# Patient Record
Sex: MALE | Race: WHITE | URBAN - METROPOLITAN AREA
[De-identification: names, ages, dates, MRNs, and addresses within clinical notes are randomized per-mention and may not be internally consistent; named-entity substitution may affect disease eponyms.]

---

## 2024-08-26 ENCOUNTER — APPOINTMENT (OUTPATIENT)
Dept: SURGICAL ONCOLOGY | Facility: CLINIC | Age: 74
End: 2024-08-26
Payer: MEDICARE

## 2024-08-26 ENCOUNTER — LAB (OUTPATIENT)
Dept: LAB | Facility: LAB | Age: 74
End: 2024-08-26
Payer: MEDICARE

## 2024-08-26 VITALS
RESPIRATION RATE: 20 BRPM | TEMPERATURE: 98 F | SYSTOLIC BLOOD PRESSURE: 164 MMHG | WEIGHT: 164.6 LBS | BODY MASS INDEX: 22.29 KG/M2 | HEIGHT: 72 IN | DIASTOLIC BLOOD PRESSURE: 80 MMHG | HEART RATE: 92 BPM

## 2024-08-26 DIAGNOSIS — K86.89 DILATED PANCREATIC DUCT (HHS-HCC): ICD-10-CM

## 2024-08-26 DIAGNOSIS — D37.8 NEOPLASM OF UNCERTAIN BEHAVIOR OF PANCREAS: ICD-10-CM

## 2024-08-26 PROCEDURE — 1159F MED LIST DOCD IN RCRD: CPT | Performed by: SURGERY

## 2024-08-26 PROCEDURE — 1036F TOBACCO NON-USER: CPT | Performed by: SURGERY

## 2024-08-26 PROCEDURE — 36415 COLL VENOUS BLD VENIPUNCTURE: CPT

## 2024-08-26 PROCEDURE — 3008F BODY MASS INDEX DOCD: CPT | Performed by: SURGERY

## 2024-08-26 PROCEDURE — 1125F AMNT PAIN NOTED PAIN PRSNT: CPT | Performed by: SURGERY

## 2024-08-26 PROCEDURE — 99203 OFFICE O/P NEW LOW 30 MIN: CPT | Performed by: SURGERY

## 2024-08-26 PROCEDURE — 86301 IMMUNOASSAY TUMOR CA 19-9: CPT

## 2024-08-26 RX ORDER — METOPROLOL SUCCINATE 50 MG/1
50 TABLET, EXTENDED RELEASE ORAL
COMMUNITY
Start: 2024-06-20 | End: 2025-06-20

## 2024-08-26 RX ORDER — FAMOTIDINE 20 MG/1
20 TABLET, FILM COATED ORAL 2 TIMES DAILY
COMMUNITY

## 2024-08-26 RX ORDER — LOPERAMIDE HCL 2 MG
2 TABLET ORAL 3 TIMES DAILY PRN
COMMUNITY

## 2024-08-26 RX ORDER — VERAPAMIL HYDROCHLORIDE 240 MG/1
240 TABLET, FILM COATED, EXTENDED RELEASE ORAL NIGHTLY
COMMUNITY
Start: 2024-05-22

## 2024-08-26 RX ORDER — TRAZODONE HYDROCHLORIDE 50 MG/1
100 TABLET ORAL NIGHTLY
COMMUNITY
Start: 2024-02-22

## 2024-08-26 RX ORDER — NAPROXEN 500 MG/1
1 TABLET ORAL
COMMUNITY
Start: 2024-06-03

## 2024-08-26 RX ORDER — TESTOSTERONE 50 MG/5G
50 GEL TRANSDERMAL DAILY
COMMUNITY
Start: 2024-05-22 | End: 2024-11-25

## 2024-08-26 RX ORDER — LOSARTAN POTASSIUM 100 MG/1
100 TABLET ORAL
COMMUNITY
Start: 2024-05-22

## 2024-08-26 RX ORDER — ASPIRIN 325 MG
325 TABLET ORAL DAILY
COMMUNITY

## 2024-08-26 RX ORDER — FLUTICASONE PROPIONATE 50 MCG
1 SPRAY, SUSPENSION (ML) NASAL
COMMUNITY
Start: 2024-02-22 | End: 2025-02-21

## 2024-08-26 ASSESSMENT — PAIN SCALES - GENERAL: PAINLEVEL: 10-WORST PAIN EVER

## 2024-08-26 NOTE — PROGRESS NOTES
Subjective     Adán Rand is a 74 y.o. male who is referred by self, patient of Brandon Figueroa. Question of periampullary mass.    HPI    Patient had left-sided abdominal pain which led to imaging.  It shows a double duct sign as evidenced on CT scan.  There is dilated pancreatic and bile duct.  He is here for evaluation.  He has had mild weight loss, 8 to 10 pounds.  Otherwise his health is stable.    Review of Systems   All other systems reviewed and are negative.           Social History     Socioeconomic History    Marital status:      Spouse name: Not on file    Number of children: Not on file    Years of education: Not on file    Highest education level: Not on file   Occupational History    Not on file   Tobacco Use    Smoking status: Former     Current packs/day: 0.00     Types: Cigarettes     Quit date:      Years since quittin.6    Smokeless tobacco: Never   Substance and Sexual Activity    Alcohol use: Yes     Alcohol/week: 3.0 standard drinks of alcohol     Types: 3 Glasses of wine per week    Drug use: Not Currently    Sexual activity: Defer   Other Topics Concern    Not on file   Social History Narrative    Not on file     Social Determinants of Health     Financial Resource Strain: Not on file   Food Insecurity: Not on file   Transportation Needs: Not on file   Physical Activity: Not on file   Stress: Not on file   Social Connections: Not on file   Intimate Partner Violence: Not on file   Housing Stability: Not on file      Current Outpatient Medications on File Prior to Visit   Medication Sig Dispense Refill    fluticasone (Flonase) 50 mcg/actuation nasal spray 1 spray by Does not apply route once daily.      losartan (Cozaar) 100 mg tablet Take 1 tablet (100 mg) by mouth once daily.      metoprolol succinate XL (Toprol-XL) 50 mg 24 hr tablet Take 1 tablet (50 mg) by mouth once daily.      naproxen (EC Naprosyn) 500 mg EC tablet Take 1 tablet (500 mg) by mouth 2 times daily (morning  "and late afternoon).      testosterone 50 mg/5 gram (1 %) gel Place 1 packet (50 mg) on the skin once daily.      traZODone (Desyrel) 50 mg tablet Take 2 tablets (100 mg) by mouth once daily at bedtime.      verapamil SR (Calan-SR) 240 mg ER tablet Take 1 tablet (240 mg) by mouth once daily at bedtime.      aspirin 325 mg tablet Take 1 tablet (325 mg) by mouth once daily.      famotidine (Pepcid) 20 mg tablet Take 1 tablet (20 mg) by mouth twice a day.      loperamide (Imodium A-D) 2 mg tablet Take 1 tablet (2 mg) by mouth 3 times a day as needed for diarrhea.       No current facility-administered medications on file prior to visit.      No family history on file.   Past Medical History:   Diagnosis Date    Psoriasis       History reviewed. No pertinent surgical history.     Objective     /80 (BP Location: Left arm)   Pulse 92   Temp 36.7 °C (98 °F) (Oral)   Resp 20   Ht 1.82 m (5' 11.65\")   Wt 74.7 kg (164 lb 9.6 oz)   BMI 22.54 kg/m²      Physical Exam  General: in no acute distress, comfortable  Eyes: no pallor or scleral icterus  Ears, nose, throat: no oropharyngeal edema  Cardiovascular: normal rate, regular rhythm  Respiratory: clear breath sounds, symmetric, no wheezes  Gastrointestinal: abdomen soft, non-tender, no masses  Musculoskeletal: normal gate, no deformities  Integumentary: no concerning lesions, no jaundice  Lymphatic: no abnormally palpable lymph nodes  Neurologic: no gross deficits  Psychiatric: cognition intact, mood appropriate    RESULTS  CT shows a double duct sign.  There is no evidence of any mass in the head of the pancreas.  I do believe I can see the bile duct and pancreas duct all the way to the ampulla.  An EUS was performed and no mass was seen.    Assessment/Plan      In summary the patient has a double duct sign.  I told him that I was mildly suspicious for pancreatic cancer but my inclination is that he does not have any cancer.  Therefore I think further workup is " warranted.  Will get an MRI/MRCP and if that is suspicious we will follow that with a repeat EUS by our gastroenterologist.    This note was created by dictation. Please excuse the typos.     José Manuel Kaye MD

## 2024-08-27 LAB — CANCER AG19-9 SERPL-ACNC: 6.59 U/ML

## 2024-09-04 ENCOUNTER — TELEPHONE (OUTPATIENT)
Dept: SURGERY | Facility: CLINIC | Age: 74
End: 2024-09-04
Payer: MEDICARE

## 2024-09-04 DIAGNOSIS — K86.89 DILATED PANCREATIC DUCT (HHS-HCC): Primary | ICD-10-CM

## 2024-09-04 DIAGNOSIS — D37.8 NEOPLASM OF UNCERTAIN BEHAVIOR OF PANCREAS: ICD-10-CM

## 2024-09-04 NOTE — TELEPHONE ENCOUNTER
I called and spoke to the pt. I informed him of his lab result being normal. He had left a msg that his MRI is scheduled for Sept 19 at [facility] and we will follow up on obtaining the images to review. The pt is aware that his images were reviewed at the pancreas conference today and we are setting him up for an EUS/ERCP pending the MRI and he is aware he may need it repeated at the  facility. He states he is still c/o left sided abd pain and some nausea but no vomiting. He denies jaundice . He voiced understanding and will call us on Tues 9/24 to discuss the MRI.

## 2024-09-20 ENCOUNTER — TELEPHONE (OUTPATIENT)
Dept: SURGERY | Facility: CLINIC | Age: 74
End: 2024-09-20
Payer: MEDICARE

## 2024-09-20 DIAGNOSIS — K86.9 LESION OF PANCREAS (HHS-HCC): ICD-10-CM

## 2024-09-20 NOTE — TELEPHONE ENCOUNTER
I returned the pt call and the pt explained that Andriy cancelled his MRI again. I discussed rescheduling in the  system and I scheduled it for this Tuesday. He is aware that we need the MRI to determine if he needs the EUS on 9/27. I explained the preop to the pt and he voiced understanding

## 2024-09-24 ENCOUNTER — HOSPITAL ENCOUNTER (OUTPATIENT)
Dept: RADIOLOGY | Facility: CLINIC | Age: 74
Discharge: HOME | End: 2024-09-24
Payer: MEDICARE

## 2024-09-24 DIAGNOSIS — K86.9 LESION OF PANCREAS (HHS-HCC): ICD-10-CM

## 2024-09-24 PROCEDURE — 2550000001 HC RX 255 CONTRASTS: Performed by: PHYSICIAN ASSISTANT

## 2024-09-24 PROCEDURE — 76376 3D RENDER W/INTRP POSTPROCES: CPT | Performed by: RADIOLOGY

## 2024-09-24 PROCEDURE — 74183 MRI ABD W/O CNTR FLWD CNTR: CPT | Performed by: RADIOLOGY

## 2024-09-24 PROCEDURE — 74183 MRI ABD W/O CNTR FLWD CNTR: CPT

## 2024-09-24 PROCEDURE — A9575 INJ GADOTERATE MEGLUMI 0.1ML: HCPCS | Performed by: PHYSICIAN ASSISTANT

## 2024-09-24 RX ORDER — GADOTERATE MEGLUMINE 376.9 MG/ML
0.2 INJECTION INTRAVENOUS
Status: COMPLETED | OUTPATIENT
Start: 2024-09-24 | End: 2024-09-24

## 2024-09-26 ENCOUNTER — ANESTHESIA EVENT (OUTPATIENT)
Dept: GASTROENTEROLOGY | Facility: HOSPITAL | Age: 74
End: 2024-09-26
Payer: MEDICARE

## 2024-09-27 ENCOUNTER — APPOINTMENT (OUTPATIENT)
Dept: GASTROENTEROLOGY | Facility: HOSPITAL | Age: 74
End: 2024-09-27
Payer: MEDICARE

## 2024-09-27 ENCOUNTER — HOSPITAL ENCOUNTER (OUTPATIENT)
Dept: RADIOLOGY | Facility: HOSPITAL | Age: 74
Setting detail: OUTPATIENT SURGERY
Discharge: HOME | End: 2024-09-27
Payer: MEDICARE

## 2024-09-27 ENCOUNTER — ANESTHESIA (OUTPATIENT)
Dept: GASTROENTEROLOGY | Facility: HOSPITAL | Age: 74
End: 2024-09-27
Payer: MEDICARE

## 2024-09-27 ENCOUNTER — HOSPITAL ENCOUNTER (OUTPATIENT)
Dept: GASTROENTEROLOGY | Facility: HOSPITAL | Age: 74
Setting detail: OUTPATIENT SURGERY
Discharge: HOME | End: 2024-09-27
Payer: MEDICARE

## 2024-09-27 VITALS
WEIGHT: 165 LBS | DIASTOLIC BLOOD PRESSURE: 60 MMHG | HEIGHT: 71 IN | BODY MASS INDEX: 23.1 KG/M2 | RESPIRATION RATE: 17 BRPM | HEART RATE: 58 BPM | OXYGEN SATURATION: 95 % | SYSTOLIC BLOOD PRESSURE: 135 MMHG | TEMPERATURE: 97.3 F

## 2024-09-27 DIAGNOSIS — K86.89 DILATED PANCREATIC DUCT (HHS-HCC): ICD-10-CM

## 2024-09-27 DIAGNOSIS — D37.8 NEOPLASM OF UNCERTAIN BEHAVIOR OF PANCREAS: ICD-10-CM

## 2024-09-27 PROCEDURE — 2550000001 HC RX 255 CONTRASTS: Performed by: STUDENT IN AN ORGANIZED HEALTH CARE EDUCATION/TRAINING PROGRAM

## 2024-09-27 PROCEDURE — 43237 ENDOSCOPIC US EXAM ESOPH: CPT | Performed by: STUDENT IN AN ORGANIZED HEALTH CARE EDUCATION/TRAINING PROGRAM

## 2024-09-27 PROCEDURE — 2500000005 HC RX 250 GENERAL PHARMACY W/O HCPCS: Performed by: STUDENT IN AN ORGANIZED HEALTH CARE EDUCATION/TRAINING PROGRAM

## 2024-09-27 PROCEDURE — 2500000005 HC RX 250 GENERAL PHARMACY W/O HCPCS

## 2024-09-27 PROCEDURE — 2500000004 HC RX 250 GENERAL PHARMACY W/ HCPCS (ALT 636 FOR OP/ED)

## 2024-09-27 PROCEDURE — 43261 ENDO CHOLANGIOPANCREATOGRAPH: CPT | Performed by: STUDENT IN AN ORGANIZED HEALTH CARE EDUCATION/TRAINING PROGRAM

## 2024-09-27 PROCEDURE — 2500000005 HC RX 250 GENERAL PHARMACY W/O HCPCS: Performed by: ANESTHESIOLOGY

## 2024-09-27 RX ORDER — SODIUM CHLORIDE, SODIUM LACTATE, POTASSIUM CHLORIDE, CALCIUM CHLORIDE 600; 310; 30; 20 MG/100ML; MG/100ML; MG/100ML; MG/100ML
100 INJECTION, SOLUTION INTRAVENOUS CONTINUOUS
Status: DISCONTINUED | OUTPATIENT
Start: 2024-09-27 | End: 2024-09-28 | Stop reason: HOSPADM

## 2024-09-27 RX ORDER — ALBUTEROL SULFATE 0.83 MG/ML
2.5 SOLUTION RESPIRATORY (INHALATION) ONCE AS NEEDED
Status: DISCONTINUED | OUTPATIENT
Start: 2024-09-27 | End: 2024-09-28 | Stop reason: HOSPADM

## 2024-09-27 RX ORDER — ONDANSETRON HYDROCHLORIDE 2 MG/ML
INJECTION, SOLUTION INTRAVENOUS AS NEEDED
Status: DISCONTINUED | OUTPATIENT
Start: 2024-09-27 | End: 2024-09-27

## 2024-09-27 RX ORDER — ONDANSETRON HYDROCHLORIDE 2 MG/ML
4 INJECTION, SOLUTION INTRAVENOUS ONCE AS NEEDED
Status: DISCONTINUED | OUTPATIENT
Start: 2024-09-27 | End: 2024-09-28 | Stop reason: HOSPADM

## 2024-09-27 RX ORDER — HYDRALAZINE HYDROCHLORIDE 20 MG/ML
5 INJECTION INTRAMUSCULAR; INTRAVENOUS EVERY 30 MIN PRN
Status: DISCONTINUED | OUTPATIENT
Start: 2024-09-27 | End: 2024-09-28 | Stop reason: HOSPADM

## 2024-09-27 RX ORDER — HYDROMORPHONE HYDROCHLORIDE 1 MG/ML
1 INJECTION, SOLUTION INTRAMUSCULAR; INTRAVENOUS; SUBCUTANEOUS EVERY 5 MIN PRN
Status: DISCONTINUED | OUTPATIENT
Start: 2024-09-27 | End: 2024-09-28 | Stop reason: HOSPADM

## 2024-09-27 RX ORDER — GLYCOPYRROLATE 0.2 MG/ML
INJECTION INTRAMUSCULAR; INTRAVENOUS AS NEEDED
Status: DISCONTINUED | OUTPATIENT
Start: 2024-09-27 | End: 2024-09-27

## 2024-09-27 RX ORDER — OXYCODONE HYDROCHLORIDE 5 MG/1
5 TABLET ORAL EVERY 4 HOURS PRN
Status: DISCONTINUED | OUTPATIENT
Start: 2024-09-27 | End: 2024-09-28 | Stop reason: HOSPADM

## 2024-09-27 RX ORDER — HYDROCODONE BITARTRATE AND ACETAMINOPHEN 5; 325 MG/1; MG/1
1 TABLET ORAL EVERY 4 HOURS PRN
Status: DISCONTINUED | OUTPATIENT
Start: 2024-09-27 | End: 2024-09-28 | Stop reason: HOSPADM

## 2024-09-27 RX ORDER — LIDOCAINE HCL/PF 100 MG/5ML
SYRINGE (ML) INTRAVENOUS AS NEEDED
Status: DISCONTINUED | OUTPATIENT
Start: 2024-09-27 | End: 2024-09-27

## 2024-09-27 RX ORDER — INDOMETHACIN 50 MG/1
SUPPOSITORY RECTAL AS NEEDED
Status: COMPLETED | OUTPATIENT
Start: 2024-09-27 | End: 2024-09-27

## 2024-09-27 RX ORDER — ROCURONIUM BROMIDE 10 MG/ML
INJECTION, SOLUTION INTRAVENOUS AS NEEDED
Status: DISCONTINUED | OUTPATIENT
Start: 2024-09-27 | End: 2024-09-27

## 2024-09-27 RX ORDER — ACETAMINOPHEN 325 MG/1
650 TABLET ORAL EVERY 4 HOURS PRN
Status: DISCONTINUED | OUTPATIENT
Start: 2024-09-27 | End: 2024-09-28 | Stop reason: HOSPADM

## 2024-09-27 RX ORDER — HYDROMORPHONE HYDROCHLORIDE 0.2 MG/ML
0.1 INJECTION INTRAMUSCULAR; INTRAVENOUS; SUBCUTANEOUS EVERY 5 MIN PRN
Status: DISCONTINUED | OUTPATIENT
Start: 2024-09-27 | End: 2024-09-28 | Stop reason: HOSPADM

## 2024-09-27 RX ORDER — FENTANYL CITRATE 50 UG/ML
INJECTION, SOLUTION INTRAMUSCULAR; INTRAVENOUS AS NEEDED
Status: DISCONTINUED | OUTPATIENT
Start: 2024-09-27 | End: 2024-09-27

## 2024-09-27 RX ORDER — MIDAZOLAM HYDROCHLORIDE 1 MG/ML
INJECTION, SOLUTION INTRAMUSCULAR; INTRAVENOUS AS NEEDED
Status: DISCONTINUED | OUTPATIENT
Start: 2024-09-27 | End: 2024-09-27

## 2024-09-27 RX ORDER — PHENYLEPHRINE HCL IN 0.9% NACL 1 MG/10 ML
SYRINGE (ML) INTRAVENOUS AS NEEDED
Status: DISCONTINUED | OUTPATIENT
Start: 2024-09-27 | End: 2024-09-27

## 2024-09-27 RX ORDER — ACETAMINOPHEN 325 MG/1
975 TABLET ORAL ONCE
Status: DISCONTINUED | OUTPATIENT
Start: 2024-09-27 | End: 2024-09-28 | Stop reason: HOSPADM

## 2024-09-27 RX ORDER — SODIUM CHLORIDE, SODIUM LACTATE, POTASSIUM CHLORIDE, CALCIUM CHLORIDE 600; 310; 30; 20 MG/100ML; MG/100ML; MG/100ML; MG/100ML
20 INJECTION, SOLUTION INTRAVENOUS CONTINUOUS
Status: CANCELLED | OUTPATIENT
Start: 2024-09-27

## 2024-09-27 RX ORDER — MIDAZOLAM HYDROCHLORIDE 1 MG/ML
1 INJECTION, SOLUTION INTRAMUSCULAR; INTRAVENOUS ONCE AS NEEDED
Status: DISCONTINUED | OUTPATIENT
Start: 2024-09-27 | End: 2024-09-28 | Stop reason: HOSPADM

## 2024-09-27 RX ORDER — PROPOFOL 10 MG/ML
INJECTION, EMULSION INTRAVENOUS AS NEEDED
Status: DISCONTINUED | OUTPATIENT
Start: 2024-09-27 | End: 2024-09-27

## 2024-09-27 SDOH — HEALTH STABILITY: MENTAL HEALTH: CURRENT SMOKER: 1

## 2024-09-27 ASSESSMENT — PAIN SCALES - GENERAL
PAINLEVEL_OUTOF10: 0 - NO PAIN
PAIN_LEVEL: 0
PAINLEVEL_OUTOF10: 0 - NO PAIN

## 2024-09-27 ASSESSMENT — PAIN - FUNCTIONAL ASSESSMENT
PAIN_FUNCTIONAL_ASSESSMENT: UNABLE TO SELF-REPORT
PAIN_FUNCTIONAL_ASSESSMENT: 0-10

## 2024-09-27 ASSESSMENT — COLUMBIA-SUICIDE SEVERITY RATING SCALE - C-SSRS
2. HAVE YOU ACTUALLY HAD ANY THOUGHTS OF KILLING YOURSELF?: NO
6. HAVE YOU EVER DONE ANYTHING, STARTED TO DO ANYTHING, OR PREPARED TO DO ANYTHING TO END YOUR LIFE?: NO
1. IN THE PAST MONTH, HAVE YOU WISHED YOU WERE DEAD OR WISHED YOU COULD GO TO SLEEP AND NOT WAKE UP?: NO

## 2024-09-27 NOTE — H&P
Procedure H&P    Patient Profile-Procedures  Name Adán Rand  Date of Birth 1950  MRN [id number]  Address   [address] [id number] [address] [facility] [address]    Primary Phone Number [phone]  Secondary Phone Number    Brandon Dumas    Procedure(s):  Procedures: EUS and ERCP  Primary contact name and number   Extended Emergency Contact Information  Primary Emergency Contact: Camryn Rand  Home Phone: [phone]  Relation: Spouse   needed? No    General Health  Weight   Vitals:    09/27/24 0711   Weight: 74.8 kg (165 lb)     BMI Body mass index is 23.01 kg/m².    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 Authorizing Provider   famotidine (Pepcid) 20 mg tablet Take 1 tablet (20 mg) by mouth twice a day.   Yes Historical Provider, MD   fluticasone (Flonase) 50 mcg/actuation nasal spray 1 spray by Does not apply route once daily. 2/22/24 2/21/25 Yes Historical Provider, MD   loperamide (Imodium A-D) 2 mg tablet Take 1 tablet (2 mg) by mouth 3 times a day as needed for diarrhea.   Yes Historical Provider, MD   losartan (Cozaar) 100 mg tablet Take 1 tablet (100 mg) by mouth once daily. 5/22/24  Yes Historical Provider, MD   metoprolol succinate XL (Toprol-XL) 50 mg 24 hr tablet Take 1 tablet (50 mg) by mouth once daily. 6/20/24 6/20/25 Yes Historical Provider, MD   naproxen (EC Naprosyn) 500 mg EC tablet Take 1 tablet (500 mg) by mouth 2 times daily (morning and late afternoon). 6/3/24  Yes Historical  Provider, MD   testosterone 50 mg/5 gram (1 %) gel Place 1 packet (50 mg) on the skin once daily. 5/22/24 11/25/24 Yes Historical Provider, MD   traZODone (Desyrel) 50 mg tablet Take 2 tablets (100 mg) by mouth once daily at bedtime. 2/22/24  Yes Historical Provider, MD   verapamil SR (Calan-SR) 240 mg ER tablet Take 1 tablet (240 mg) by mouth once daily at bedtime. 5/22/24  Yes Historical Provider, MD   aspirin 325 mg tablet Take 1 tablet (325 mg) by mouth once daily.    Historical Provider, MD       Physical Exam  Vitals:    09/27/24 0711   BP: 126/60   Pulse: 76   Resp: 16   Temp: 37 °C (98.6 °F)   SpO2: 95%        General: A&Ox3, NAD.  HEENT: AT/NC.   CV: RRR. No murmur.  Resp: CTA bilaterally. No wheezing, rhonchi or rales.   GI: Soft, NT/ND. BSx4.  Extrem: No edema. Pulses intact.  Neuro: No focal deficits.   Psych: Normal mood and affect.      Procedure Plan - pre-procedural (re)assesment completed by physician:  discharge/transfer patient when discharge criteria met    Osmin Novoa MD  9/27/2024 7:58 AM

## 2024-09-27 NOTE — DISCHARGE INSTRUCTIONS
Patient Instructions after an Endoscopy      Post-procedure recommendations:  - The patient will be observed post-procedure, until all discharge criteria are met.   - Discharge the patient to home with family member/escort.  - Resume previous diet.  - Continue present medications.  - Observe patient's clinical course following today's procedure with therapeutic intervention.   - Watch for bleeding, perforation, and infection.   - Follow-up with referring physician.   - Return to primary care physician.  - The patient has a contact number available for  emergencies.  The signs and symptoms of potential delayed complications were discussed with the patient.  Return to normal activities tomorrow.  Written discharge instructions were provided to the patient.    The anesthetics, sedatives or narcotics which were given to you today will be acting in your body for the next 24 hours, so you might feel a little sleepy or groggy.  This feeling should slowly wear off. Carefully read and follow the instructions.     You received sedation today:  - Do not drive or operate any machinery or power tools of any kind.   - No alcoholic beverages today, not even beer or wine.  - Do not make any important decisions or sign any legal documents.  - No over the counter medications that contain alcohol or that may cause drowsiness.  - Do not make any important decisions or sign any legal documents.    While it is common to experience mild to moderate abdominal distention, gas, or belching after your procedure, if any of these symptoms occur following discharge from the GI Lab or within one week of having your procedure, call the [facility] to be advised whether a visit to your nearest Urgent Care or Emergency Department is indicated.  Take this paper with you if you go:  - If you develop an allergic reaction to the medications that were given during your procedure such as difficulty breathing, rash, hives, severe nausea,  vomiting or lightheadedness.  - If you experience chest pain, shortness of breath, severe abdominal pain, fevers and chills.  - If you develop signs and symptoms of bleeding such as blood in your spit, if your stools turn black, tarry, or bloody.  - If you have not urinated within 8 hours following your procedure.  - If your IV site becomes painful, red, inflamed, or looks infected.       If you experience any problems or have any questions following discharge from the GI Lab, please call: [phone]

## 2024-09-27 NOTE — ANESTHESIA PROCEDURE NOTES
Airway  Date/Time: 9/27/2024 8:21 AM  Urgency: elective    Airway not difficult    Staffing  Performed: SRNA   Authorized by: Crissy Vick MD    Performed by: Colten Woods  Patient location during procedure: OR    Indications and Patient Condition  Indications for airway management: anesthesia and airway protection  Spontaneous Ventilation: absent  Sedation level: deep  Preoxygenated: yes  Patient position: sniffing  Mask difficulty assessment: 2 - vent by mask + OA or adjuvant +/- NMBA  Planned trial extubation    Final Airway Details  Final airway type: endotracheal airway      Successful airway: ETT  Cuffed: yes   Successful intubation technique: direct laryngoscopy  Facilitating devices/methods: intubating stylet  Endotracheal tube insertion site: oral  Blade: Paty  Blade size: #4  ETT size (mm): 7.5  Cormack-Lehane Classification: grade I - full view of glottis  Placement verified by: chest auscultation and capnometry   Measured from: gums  ETT to gums (cm): 23  Number of attempts at approach: 1  Number of other approaches attempted: 0    Additional Comments  Lips and teeth in pre anesthetic condition after intubation

## 2024-09-27 NOTE — ANESTHESIA PREPROCEDURE EVALUATION
Patient: Adán Rand    Procedure Information       Anesthesia Start Date/Time: 09/27/24 0812    Scheduled providers: Osmin Novoa MD    Procedures:       ENDOSCOPIC ULTRASOUND (UPPER)      ERCP    Location: [facility]            Relevant Problems   No relevant active problems       Clinical information reviewed:   Tobacco  Allergies  Meds  Problems  Med Hx  Surg Hx   Fam Hx  Soc   Hx        NPO Detail:  NPO/Void Status  Date of Last Liquid: 09/27/24  Time of Last Liquid: 0000  Date of Last Solid: 09/26/24  Time of Last Solid: 1800  Last Intake Type: Clear fluids  Time of Last Void: 0530         Physical Exam    Airway  Mallampati: III  TM distance: >3 FB  Neck ROM: full     Cardiovascular   Rhythm: regular  Rate: normal     Dental    Pulmonary   Breath sounds clear to auscultation  (+) decreased breath sounds     Abdominal   (+) obese  Abdomen: soft  Bowel sounds: normal           Anesthesia Plan    History of general anesthesia?: no  History of complications of general anesthesia?: unknown/emergency    ASA 3     general     The patient is a current smoker.  Patient was previously instructed to abstain from smoking on day of procedure.  Patient smoked on day of procedure.  Education provided regarding risk of obstructive sleep apnea.  intravenous induction   Postoperative administration of opioids is intended.  Anesthetic plan and risks discussed with patient and spouse.  Use of blood products discussed with patient and spouse who consented to blood products.    Plan discussed with CRNA.

## 2024-09-27 NOTE — ANESTHESIA POSTPROCEDURE EVALUATION
Patient: Adán Rand    Procedure Summary       Date: 09/27/24 Room / Location: [facility]    Anesthesia Start: 0812 Anesthesia Stop: 0948    Procedures:       ENDOSCOPIC ULTRASOUND (UPPER)      ERCP Diagnosis:       Dilated pancreatic duct (HHS-HCC)      Neoplasm of uncertain behavior of pancreas    Scheduled Providers: Osmin Novoa MD Responsible Provider: Crissy Vick MD    Anesthesia Type: general ASA Status: 3            Anesthesia Type: general    Vitals Value Taken Time   /56 09/27/24 1030   Temp 36.5 °C (97.7 °F) 09/27/24 1030   Pulse 52 09/27/24 1039   Resp 19 09/27/24 1039   SpO2 96 % 09/27/24 1040   Vitals shown include unfiled device data.    Anesthesia Post Evaluation    Patient location during evaluation: PACU  Patient participation: complete - patient participated  Level of consciousness: sleepy but conscious, awake, responsive to physical stimuli, responsive to light touch and responsive to verbal stimuli  Pain score: 0  Pain management: adequate  Airway patency: patent  Two or more strategies used to mitigate risk of obstructive sleep apnea  Cardiovascular status: acceptable, hemodynamically stable and stable  Respiratory status: acceptable, unassisted, spontaneous ventilation and nonlabored ventilation  Hydration status: acceptable  Postoperative Nausea and Vomiting: none  Comments: Unexplained sustained and somewhat therapy resistant hypotension intra-op while laying prone for the procedure. Required significant amount of IV Phenylephrine and Ephedrine to normalize BP. Resolved in the procedure room        There were no known notable events for this encounter.

## 2024-09-27 NOTE — Clinical Note
EUS   ERCP W/ PANCREATIC AND CBD SPHINCTEROTMY, BALLOON SWEEP, STENT PLACED IN BOTH CBD AND PANCREATIC DUCT.  AMPULLARY BIOPSY  AMPULLARY STENOSIS

## 2024-10-05 LAB
LABORATORY COMMENT REPORT: NORMAL
PATH REPORT.FINAL DX SPEC: NORMAL
PATH REPORT.GROSS SPEC: NORMAL
PATH REPORT.TOTAL CANCER: NORMAL
RESIDENT REVIEW: NORMAL

## 2024-10-14 NOTE — RESULT ENCOUNTER NOTE
Can you please let him know his biopsy results were benign and schedule him for GI clinic follow up with myself (virtual is fine) He was having significant symptoms following his procedure and I did try to call him but he didn't answer. Would be helpful to see how he's feeling now.  regular

## 2024-10-23 ENCOUNTER — TELEPHONE (OUTPATIENT)
Dept: GASTROENTEROLOGY | Facility: HOSPITAL | Age: 74
End: 2024-10-23
Payer: MEDICARE

## 2024-10-28 ENCOUNTER — APPOINTMENT (OUTPATIENT)
Dept: SURGICAL ONCOLOGY | Facility: CLINIC | Age: 74
End: 2024-10-28
Payer: MEDICARE

## 2024-10-28 VITALS
TEMPERATURE: 98.7 F | RESPIRATION RATE: 18 BRPM | DIASTOLIC BLOOD PRESSURE: 88 MMHG | WEIGHT: 157 LBS | SYSTOLIC BLOOD PRESSURE: 161 MMHG | BODY MASS INDEX: 21.9 KG/M2

## 2024-10-28 DIAGNOSIS — K83.1 BILE DUCT STRICTURE (CMS-HCC): Primary | ICD-10-CM

## 2024-10-28 PROCEDURE — 99214 OFFICE O/P EST MOD 30 MIN: CPT | Performed by: SURGERY

## 2024-11-08 ENCOUNTER — TELEPHONE (OUTPATIENT)
Dept: SURGICAL ONCOLOGY | Facility: CLINIC | Age: 74
End: 2024-11-08
Payer: MEDICARE

## 2024-11-08 NOTE — TELEPHONE ENCOUNTER
The pt had called to update that he had gone to the ED at [facility]. He was weak and heartrate was fast and possible irregular. He continues to have diarrhea multiple times a day. He had labs and CT scan but was discharged. He is to have his tent removed in early Dec and questions if needs out sooner. He denies a fever and has an appt this Tues with the NP in Dr. Muir office in GI.  He will inquire about a stool sample for c diff . We talked about creon which he has but had only taken it once  as he thought that caused the diarrhea. He still has diarrhea without the medication. I instructed him on the use and purpose. of the creon and he is willing to try it again to see if it helps his symptoms. I will update Dr. Kaye and Dr. Novoa and I've ordered his images from [facility] to PACS for review. The pt is aware if his symptoms change or he feels worse he would have to go back to the ED. He voiced understanding

## 2024-12-02 ENCOUNTER — ANESTHESIA EVENT (OUTPATIENT)
Dept: GASTROENTEROLOGY | Facility: HOSPITAL | Age: 74
End: 2024-12-02
Payer: MEDICARE

## 2024-12-02 ENCOUNTER — HOSPITAL ENCOUNTER (OUTPATIENT)
Dept: RADIOLOGY | Facility: HOSPITAL | Age: 74
Discharge: HOME | End: 2024-12-02
Payer: MEDICARE

## 2024-12-02 ENCOUNTER — HOSPITAL ENCOUNTER (OUTPATIENT)
Dept: GASTROENTEROLOGY | Facility: HOSPITAL | Age: 74
Discharge: HOME | End: 2024-12-02
Payer: MEDICARE

## 2024-12-02 ENCOUNTER — ANESTHESIA (OUTPATIENT)
Dept: GASTROENTEROLOGY | Facility: HOSPITAL | Age: 74
End: 2024-12-02
Payer: MEDICARE

## 2024-12-02 ENCOUNTER — APPOINTMENT (OUTPATIENT)
Dept: RADIOLOGY | Facility: HOSPITAL | Age: 74
End: 2024-12-02
Payer: MEDICARE

## 2024-12-02 VITALS
WEIGHT: 142.9 LBS | SYSTOLIC BLOOD PRESSURE: 158 MMHG | HEIGHT: 71 IN | RESPIRATION RATE: 18 BRPM | BODY MASS INDEX: 20.01 KG/M2 | OXYGEN SATURATION: 98 % | TEMPERATURE: 99.1 F | DIASTOLIC BLOOD PRESSURE: 86 MMHG | HEART RATE: 74 BPM

## 2024-12-02 DIAGNOSIS — D37.8 NEOPLASM OF UNCERTAIN BEHAVIOR OF PANCREAS: ICD-10-CM

## 2024-12-02 DIAGNOSIS — K29.01 ACUTE GASTRITIS WITH HEMORRHAGE, UNSPECIFIED GASTRITIS TYPE: Primary | ICD-10-CM

## 2024-12-02 DIAGNOSIS — K86.89 DILATED PANCREATIC DUCT (HHS-HCC): ICD-10-CM

## 2024-12-02 PROCEDURE — 2500000005 HC RX 250 GENERAL PHARMACY W/O HCPCS: Performed by: ANESTHESIOLOGIST ASSISTANT

## 2024-12-02 PROCEDURE — 74328 X-RAY BILE DUCT ENDOSCOPY: CPT | Performed by: STUDENT IN AN ORGANIZED HEALTH CARE EDUCATION/TRAINING PROGRAM

## 2024-12-02 PROCEDURE — 43275 ERCP REMOVE FORGN BODY DUCT: CPT | Performed by: STUDENT IN AN ORGANIZED HEALTH CARE EDUCATION/TRAINING PROGRAM

## 2024-12-02 PROCEDURE — A43275 PR ERCP REMOVE FOREIGN BODY/STENT BILIARY/PANC DUCT: Performed by: ANESTHESIOLOGIST ASSISTANT

## 2024-12-02 PROCEDURE — 7100000009 HC PHASE TWO TIME - INITIAL BASE CHARGE

## 2024-12-02 PROCEDURE — 2720000007 HC OR 272 NO HCPCS

## 2024-12-02 PROCEDURE — 3700000002 HC GENERAL ANESTHESIA TIME - EACH INCREMENTAL 1 MINUTE

## 2024-12-02 PROCEDURE — C1769 GUIDE WIRE: HCPCS

## 2024-12-02 PROCEDURE — 3700000001 HC GENERAL ANESTHESIA TIME - INITIAL BASE CHARGE

## 2024-12-02 PROCEDURE — 7100000001 HC RECOVERY ROOM TIME - INITIAL BASE CHARGE

## 2024-12-02 PROCEDURE — 7100000002 HC RECOVERY ROOM TIME - EACH INCREMENTAL 1 MINUTE

## 2024-12-02 PROCEDURE — 43261 ENDO CHOLANGIOPANCREATOGRAPH: CPT | Performed by: STUDENT IN AN ORGANIZED HEALTH CARE EDUCATION/TRAINING PROGRAM

## 2024-12-02 PROCEDURE — A43275 PR ERCP REMOVE FOREIGN BODY/STENT BILIARY/PANC DUCT: Performed by: ANESTHESIOLOGY

## 2024-12-02 PROCEDURE — 2500000004 HC RX 250 GENERAL PHARMACY W/ HCPCS (ALT 636 FOR OP/ED): Performed by: ANESTHESIOLOGIST ASSISTANT

## 2024-12-02 PROCEDURE — 2500000005 HC RX 250 GENERAL PHARMACY W/O HCPCS: Performed by: ANESTHESIOLOGY

## 2024-12-02 PROCEDURE — 99100 ANES PT EXTEME AGE<1 YR&>70: CPT | Performed by: ANESTHESIOLOGY

## 2024-12-02 PROCEDURE — 7100000010 HC PHASE TWO TIME - EACH INCREMENTAL 1 MINUTE

## 2024-12-02 RX ORDER — HYDROMORPHONE HYDROCHLORIDE 0.2 MG/ML
0.1 INJECTION INTRAMUSCULAR; INTRAVENOUS; SUBCUTANEOUS EVERY 5 MIN PRN
Status: DISCONTINUED | OUTPATIENT
Start: 2024-12-02 | End: 2024-12-03 | Stop reason: HOSPADM

## 2024-12-02 RX ORDER — SUCCINYLCHOLINE CHLORIDE 100 MG/5ML
SYRINGE (ML) INTRAVENOUS AS NEEDED
Status: DISCONTINUED | OUTPATIENT
Start: 2024-12-02 | End: 2024-12-02

## 2024-12-02 RX ORDER — PROPOFOL 10 MG/ML
INJECTION, EMULSION INTRAVENOUS AS NEEDED
Status: DISCONTINUED | OUTPATIENT
Start: 2024-12-02 | End: 2024-12-02

## 2024-12-02 RX ORDER — ACETAMINOPHEN 325 MG/1
975 TABLET ORAL ONCE
Status: DISCONTINUED | OUTPATIENT
Start: 2024-12-02 | End: 2024-12-03 | Stop reason: HOSPADM

## 2024-12-02 RX ORDER — HYDROCODONE BITARTRATE AND ACETAMINOPHEN 5; 325 MG/1; MG/1
1 TABLET ORAL EVERY 4 HOURS PRN
Status: DISCONTINUED | OUTPATIENT
Start: 2024-12-02 | End: 2024-12-03 | Stop reason: HOSPADM

## 2024-12-02 RX ORDER — OMEPRAZOLE 20 MG/1
20 CAPSULE, DELAYED RELEASE ORAL
Qty: 30 CAPSULE | Refills: 2 | Status: SHIPPED | OUTPATIENT
Start: 2024-12-02 | End: 2025-03-02

## 2024-12-02 RX ORDER — ONDANSETRON HYDROCHLORIDE 2 MG/ML
4 INJECTION, SOLUTION INTRAVENOUS ONCE AS NEEDED
Status: DISCONTINUED | OUTPATIENT
Start: 2024-12-02 | End: 2024-12-03 | Stop reason: HOSPADM

## 2024-12-02 RX ORDER — LIDOCAINE HYDROCHLORIDE 20 MG/ML
INJECTION, SOLUTION INFILTRATION; PERINEURAL AS NEEDED
Status: DISCONTINUED | OUTPATIENT
Start: 2024-12-02 | End: 2024-12-02

## 2024-12-02 RX ORDER — HYDROMORPHONE HYDROCHLORIDE 0.2 MG/ML
0.2 INJECTION INTRAMUSCULAR; INTRAVENOUS; SUBCUTANEOUS EVERY 5 MIN PRN
Status: DISCONTINUED | OUTPATIENT
Start: 2024-12-02 | End: 2024-12-03 | Stop reason: HOSPADM

## 2024-12-02 RX ORDER — OXYCODONE HYDROCHLORIDE 5 MG/1
5 TABLET ORAL EVERY 4 HOURS PRN
Status: DISCONTINUED | OUTPATIENT
Start: 2024-12-02 | End: 2024-12-03 | Stop reason: HOSPADM

## 2024-12-02 RX ORDER — FENTANYL CITRATE 50 UG/ML
INJECTION, SOLUTION INTRAMUSCULAR; INTRAVENOUS AS NEEDED
Status: DISCONTINUED | OUTPATIENT
Start: 2024-12-02 | End: 2024-12-02

## 2024-12-02 RX ORDER — ONDANSETRON HYDROCHLORIDE 2 MG/ML
INJECTION, SOLUTION INTRAVENOUS AS NEEDED
Status: DISCONTINUED | OUTPATIENT
Start: 2024-12-02 | End: 2024-12-02

## 2024-12-02 RX ORDER — ACETAMINOPHEN 325 MG/1
650 TABLET ORAL EVERY 4 HOURS PRN
Status: DISCONTINUED | OUTPATIENT
Start: 2024-12-02 | End: 2024-12-03 | Stop reason: HOSPADM

## 2024-12-02 RX ORDER — MIDAZOLAM HYDROCHLORIDE 1 MG/ML
1 INJECTION, SOLUTION INTRAMUSCULAR; INTRAVENOUS ONCE AS NEEDED
Status: DISCONTINUED | OUTPATIENT
Start: 2024-12-02 | End: 2024-12-03 | Stop reason: HOSPADM

## 2024-12-02 RX ORDER — ALBUTEROL SULFATE 0.83 MG/ML
2.5 SOLUTION RESPIRATORY (INHALATION) ONCE AS NEEDED
Status: DISCONTINUED | OUTPATIENT
Start: 2024-12-02 | End: 2024-12-03 | Stop reason: HOSPADM

## 2024-12-02 RX ORDER — HYDRALAZINE HYDROCHLORIDE 20 MG/ML
5 INJECTION INTRAMUSCULAR; INTRAVENOUS EVERY 30 MIN PRN
Status: DISCONTINUED | OUTPATIENT
Start: 2024-12-02 | End: 2024-12-03 | Stop reason: HOSPADM

## 2024-12-02 SDOH — HEALTH STABILITY: MENTAL HEALTH: CURRENT SMOKER: 1

## 2024-12-02 ASSESSMENT — PAIN - FUNCTIONAL ASSESSMENT
PAIN_FUNCTIONAL_ASSESSMENT: 0-10

## 2024-12-02 ASSESSMENT — PAIN SCALES - GENERAL
PAINLEVEL_OUTOF10: 0 - NO PAIN
PAIN_LEVEL: 0
PAINLEVEL_OUTOF10: 0 - NO PAIN

## 2024-12-02 NOTE — ANESTHESIA PREPROCEDURE EVALUATION
Patient: Adán Rand    Procedure Information       Anesthesia Start Date/Time: 12/02/24 0819    Scheduled providers: Osmin Novoa MD    Procedure: ERCP    Location: [facility]            Relevant Problems   No relevant active problems       Clinical information reviewed:   Tobacco  Allergies  Meds  Problems  Med Hx  Surg Hx   Fam Hx  Soc   Hx        NPO Detail:  NPO/Void Status  Carbohydrate Drink Given Prior to Surgery? : N  Date of Last Liquid: 12/01/24  Time of Last Liquid: 2200  Date of Last Solid: 11/29/24  Time of Last Solid: 2000  Last Intake Type: Clear fluids  Time of Last Void: 0600         Physical Exam    Airway  Mallampati: II  TM distance: >3 FB  Neck ROM: full     Cardiovascular - normal exam  Rhythm: regular  Rate: normal     Dental    Pulmonary   Breath sounds clear to auscultation  (+) decreased breath sounds     Abdominal   (+) obese  Abdomen: soft  Bowel sounds: normal           Anesthesia Plan    History of general anesthesia?: yes  History of complications of general anesthesia?: no    ASA 3     general     The patient is a current smoker.  Patient was previously instructed to abstain from smoking on day of procedure.  Patient smoked on day of procedure.  Education provided regarding risk of obstructive sleep apnea.  intravenous induction   Postoperative administration of opioids is intended.  Anesthetic plan and risks discussed with patient and spouse.  Use of blood products discussed with patient and spouse who consented to blood products.    Plan discussed with CAA.

## 2024-12-02 NOTE — H&P
Procedure H&P    Patient Profile-Procedures  Name Adán Rand  Date of Birth 1950  MRN [id number]  Address   [address] [id number] [facility] [address]    Primary Phone Number [phone]  Secondary Phone Number    Brandon Dumas    Procedure(s):  Procedures: ERCP  Primary contact name and number   Extended Emergency Contact Information  Primary Emergency Contact: Camryn Rand  Home Phone: [phone]  Relation: Spouse   needed? No    General Health  Weight There were no vitals filed for this visit.  BMI There is no height or weight on file to calculate BMI.    Allergies  Allergies   Allergen Reactions    Amlodipine Unknown    Beef Containing Products GI Upset    [place] GI Upset    Lactose GI Upset and Other    Milk Other and Unknown       Past Medical History   Past Medical History:   Diagnosis Date    GERD (gastroesophageal reflux disease)     Hypertension     Psoriasis        Provider assessment  Diagnosis:  Stent management  Medication Reviewed - yes  Prior to Admission medications    Medication Sig Start Date End Date Taking? Authorizing Provider   famotidine (Pepcid) 20 mg tablet Take 1 tablet (20 mg) by mouth twice a day.   Yes Historical Provider, MD   fluticasone (Flonase) 50 mcg/actuation nasal spray 1 spray by Does not apply route once daily. 2/22/24 2/21/25 Yes Historical Provider, MD   loperamide (Imodium A-D) 2 mg tablet Take 1 tablet (2 mg) by mouth 3 times a day as needed for diarrhea.   Yes Historical Provider, MD   losartan (Cozaar) 100 mg tablet Take 1 tablet (100 mg) by mouth once daily. 5/22/24  Yes Historical Provider, MD   metoprolol succinate XL (Toprol-XL) 50 mg 24 hr tablet Take 1 tablet (50 mg) by mouth once daily. 6/20/24 6/20/25 Yes Historical Provider, MD   naproxen (EC Naprosyn) 500 mg EC tablet Take 1 tablet (500 mg) by mouth 2 times daily (morning and late afternoon). 6/3/24  Yes Historical Provider, MD   testosterone 50 mg/5 gram (1  %) gel Place 1 packet (50 mg) on the skin once daily. 5/22/24 12/2/24 Yes Historical Provider, MD   verapamil SR (Calan-SR) 240 mg ER tablet Take 1 tablet (240 mg) by mouth once daily at bedtime. 5/22/24  Yes Historical Provider, MD   aspirin 325 mg tablet Take 1 tablet (325 mg) by mouth once daily.  Patient not taking: Reported on 12/2/2024    Historical Provider, MD   traZODone (Desyrel) 50 mg tablet Take 2 tablets (100 mg) by mouth once daily at bedtime. 2/22/24   Historical Provider, MD       Physical Exam  There were no vitals filed for this visit.     General: A&Ox3, NAD.  HEENT: AT/NC.   CV: RRR. No murmur.  Resp: CTA bilaterally. No wheezing, rhonchi or rales.   GI: Soft, NT/ND. BSx4.  Extrem: No edema. Pulses intact.  Neuro: No focal deficits.   Psych: Normal mood and affect.      Procedure Plan - pre-procedural (re)assesment completed by physician:  discharge/transfer patient when discharge criteria met    Osmin Novoa MD  12/2/2024 7:42 AM

## 2024-12-02 NOTE — ANESTHESIA POSTPROCEDURE EVALUATION
Patient: Adán Rand    Procedure Summary       Date: 12/02/24 Room / Location: [facility]    Anesthesia Start: 0819 Anesthesia Stop: 0906    Procedure: ERCP Diagnosis:       Dilated pancreatic duct (HHS-HCC)      Neoplasm of uncertain behavior of pancreas    Scheduled Providers: Osmin Noova MD Responsible Provider: Crissy Vick MD    Anesthesia Type: general ASA Status: 3            Anesthesia Type: general    Vitals Value Taken Time   /73 12/02/24 0903   Temp 36 12/02/24 0906   Pulse 78 12/02/24 0906   Resp 16 12/02/24 0906   SpO2 100 12/02/24 0906   Vitals shown include unfiled device data.    Anesthesia Post Evaluation    Patient location during evaluation: PACU  Patient participation: complete - patient cannot participate  Level of consciousness: sleepy but conscious  Pain score: 0  Pain management: adequate  There was medical reason for not using a multimodal analgesia pain management approach.Airway patency: patent  Two or more strategies used to mitigate risk of obstructive sleep apnea  Cardiovascular status: acceptable and stable  Respiratory status: acceptable, face mask, spontaneous ventilation, nonlabored ventilation and unassisted  Hydration status: acceptable  Postoperative Nausea and Vomiting: none        No notable events documented.

## 2024-12-02 NOTE — ANESTHESIA PROCEDURE NOTES
Peripheral IV  Date/Time: 12/2/2024 8:41 AM  Inserted by: ELIA Red    Placement  Needle size: 20 G  Laterality: left  Location: forearm  Local anesthetic: none  Site prep: alcohol  Technique: anatomical landmarks  Attempts: 2

## 2024-12-02 NOTE — DISCHARGE INSTRUCTIONS
Patient Instructions after an Endoscopy      Post-procedure recommendations:  - The patient will be observed post-procedure, until all discharge criteria are met.   - Discharge the patient to home with family member/escort.  - Resume previous diet.  - Continue present medications.  - Observe patient's clinical course following today's procedure with therapeutic intervention.   - Watch for bleeding, perforation, and infection.   - Follow-up with referring physician.   - Return to primary care physician.  - The patient has a contact number available for  emergencies.  The signs and symptoms of potential delayed complications were discussed with the patient.  Return to normal activities tomorrow.  Written discharge instructions were provided to the patient.    The anesthetics, sedatives or narcotics which were given to you today will be acting in your body for the next 24 hours, so you might feel a little sleepy or groggy.  This feeling should slowly wear off. Carefully read and follow the instructions.     You received sedation today:  - Do not drive or operate any machinery or power tools of any kind.   - No alcoholic beverages today, not even beer or wine.  - Do not make any important decisions or sign any legal documents.  - No over the counter medications that contain alcohol or that may cause drowsiness.  - Do not make any important decisions or sign any legal documents.    While it is common to experience mild to moderate abdominal distention, gas, or belching after your procedure, if any of these symptoms occur following discharge from the GI Lab or within one week of having your procedure, call the [facility] to be advised whether a visit to your nearest Urgent Care or Emergency Department is indicated.  Take this paper with you if you go:  - If you develop an allergic reaction to the medications that were given during your procedure such as difficulty breathing, rash, hives, severe nausea,  vomiting or lightheadedness.  - If you experience chest pain, shortness of breath, severe abdominal pain, fevers and chills.  - If you develop signs and symptoms of bleeding such as blood in your spit, if your stools turn black, tarry, or bloody.  - If you have not urinated within 8 hours following your procedure.  - If your IV site becomes painful, red, inflamed, or looks infected.       If you experience any problems or have any questions following discharge from the GI Lab, please call: [phone]

## 2024-12-02 NOTE — ANESTHESIA PROCEDURE NOTES
Airway  Date/Time: 12/2/2024 8:25 AM  Urgency: elective    Airway not difficult    Staffing  Performed: CAA and SAWYER   Authorized by: Crissy Vick MD    Performed by: ELIA Red  Patient location during procedure: OR    Indications and Patient Condition  Indications for airway management: anesthesia and airway protection  Spontaneous ventilation: present  Sedation level: deep  Preoxygenated: yes  Patient position: sniffing  MILS maintained throughout  Mask difficulty assessment: 1 - vent by mask    Final Airway Details  Final airway type: endotracheal airway      Successful airway: ETT  Cuffed: yes   Successful intubation technique: direct laryngoscopy  Facilitating devices/methods: intubating stylet  Endotracheal tube insertion site: oral  Blade: Paty  Blade size: #4  ETT size (mm): 7.5  Cormack-Lehane Classification: grade I - full view of glottis  Placement verified by: chest auscultation and capnometry   Inital cuff pressure (cm H2O): 12  Cuff volume (mL): 6  Measured from: lips  ETT to lips (cm): 23  Number of attempts at approach: 1  Number of other approaches attempted: 0

## 2024-12-03 PROCEDURE — 88305 TISSUE EXAM BY PATHOLOGIST: CPT

## 2024-12-03 PROCEDURE — 88305 TISSUE EXAM BY PATHOLOGIST: CPT | Performed by: PATHOLOGY

## 2024-12-03 PROCEDURE — 0753T DGTZ GLS MCRSCP SLD LEVEL IV: CPT

## 2024-12-04 ENCOUNTER — LAB REQUISITION (OUTPATIENT)
Dept: LAB | Facility: HOSPITAL | Age: 74
End: 2024-12-04
Payer: MEDICARE

## 2024-12-04 DIAGNOSIS — R19.7 DIARRHEA, UNSPECIFIED: ICD-10-CM

## 2024-12-12 LAB
LABORATORY COMMENT REPORT: NORMAL
PATH REPORT.FINAL DX SPEC: NORMAL
PATH REPORT.GROSS SPEC: NORMAL
PATH REPORT.MICROSCOPIC SPEC OTHER STN: NORMAL
PATH REPORT.RELEVANT HX SPEC: NORMAL
PATH REPORT.TOTAL CANCER: NORMAL

## 2024-12-14 LAB
LAB AP ASR DISCLAIMER: NORMAL
LABORATORY COMMENT REPORT: NORMAL
PATH REPORT.FINAL DX SPEC: NORMAL
PATH REPORT.GROSS SPEC: NORMAL
PATH REPORT.RELEVANT HX SPEC: NORMAL
PATH REPORT.TOTAL CANCER: NORMAL

## 2025-03-06 ENCOUNTER — TELEPHONE (OUTPATIENT)
Dept: GASTROENTEROLOGY | Facility: CLINIC | Age: 75
End: 2025-03-06
Payer: MEDICARE